# Patient Record
Sex: FEMALE | Race: WHITE | NOT HISPANIC OR LATINO | Employment: FULL TIME | URBAN - METROPOLITAN AREA
[De-identification: names, ages, dates, MRNs, and addresses within clinical notes are randomized per-mention and may not be internally consistent; named-entity substitution may affect disease eponyms.]

---

## 2024-06-18 ENCOUNTER — OFFICE VISIT (OUTPATIENT)
Dept: SURGERY | Facility: CLINIC | Age: 29
End: 2024-06-18
Payer: COMMERCIAL

## 2024-06-18 VITALS — HEIGHT: 67 IN | BODY MASS INDEX: 24.33 KG/M2 | WEIGHT: 155 LBS

## 2024-06-18 DIAGNOSIS — N64.4 MASTODYNIA OF LEFT BREAST: ICD-10-CM

## 2024-06-18 DIAGNOSIS — N63.22 BREAST LUMP ON LEFT SIDE AT 10 O'CLOCK POSITION: Primary | ICD-10-CM

## 2024-06-18 PROCEDURE — 1159F MED LIST DOCD IN RCRD: CPT | Mod: CPTII,S$GLB,, | Performed by: NURSE PRACTITIONER

## 2024-06-18 PROCEDURE — 3044F HG A1C LEVEL LT 7.0%: CPT | Mod: CPTII,S$GLB,, | Performed by: NURSE PRACTITIONER

## 2024-06-18 PROCEDURE — 3008F BODY MASS INDEX DOCD: CPT | Mod: CPTII,S$GLB,, | Performed by: NURSE PRACTITIONER

## 2024-06-18 PROCEDURE — 99999 PR PBB SHADOW E&M-EST. PATIENT-LVL III: CPT | Mod: PBBFAC,,, | Performed by: NURSE PRACTITIONER

## 2024-06-18 PROCEDURE — 1160F RVW MEDS BY RX/DR IN RCRD: CPT | Mod: CPTII,S$GLB,, | Performed by: NURSE PRACTITIONER

## 2024-06-18 PROCEDURE — 99204 OFFICE O/P NEW MOD 45 MIN: CPT | Mod: S$GLB,,, | Performed by: NURSE PRACTITIONER

## 2024-06-18 NOTE — ASSESSMENT & PLAN NOTE
We discussed our fibrocystic mastopathy protocol in detail. She knows that if she follows this protocol - that her symptoms should improve.  We discussed how breast pain is usually not associated with breast cancer, however, pain can be the presenting symptom with some cancers (but this could be coincidental). Still, if her pain does not improve in 8-12 weeks she should call us back for additional recommendations.

## 2024-06-18 NOTE — PROGRESS NOTES
[facility]  Mark Cisneros MD, FACS  Korina Albrecht NP-C      Date of Service: 6/18/2024    Chief Complaint:   Jessa Rosenbaum is a 28 y.o. female presenting today after her imaging workup found a suspicious mass in the left breast for which biopsy has been recommended.   She first noticed left breast pain two weeks ago.      History of Present Illness:   Jessa Rosenbaum is a 28 y.o. female who presents on June 18,2024 with a left breast mass for which biopsy has been recommended.  MD::: Elise Dietrich MD    Past Medical History:   Diagnosis Date    Breast lump on left side at 10 o'clock position 06/18/2024    Breast pain, left     Panic attacks       Past Surgical History:   Procedure Laterality Date    tonsils and adenoids      wisdom teeth removal          Current Outpatient Medications:     ALPRAZolam (XANAX) 0.25 MG tablet, alprazolam 0.25 mg tablet, Disp: , Rfl:     EPINEPHrine (EPIPEN) 0.3 mg/0.3 mL AtIn, , Disp: , Rfl:     FLUoxetine 10 MG capsule, Take 1 capsule by mouth once daily., Disp: , Rfl:     pantoprazole (PROTONIX) 40 MG tablet, , Disp: , Rfl:    Review of patient's allergies indicates:   Allergen Reactions    Tree nut Hives, Itching and Swelling    Latex, natural rubber     Nut - unspecified     Peanut Hives, Itching and Rash    Shellfish containing products Hives, Itching and Rash      Social History     Tobacco Use    Smoking status: Never    Smokeless tobacco: Never   Substance Use Topics    Alcohol use: Yes      Family History   Problem Relation Name Age of Onset    Breast cancer Other Mat great aunt 45 - 49    Ovarian cancer Neg Hx          Review of Systems   Integumentary:  Positive for breast tenderness. Negative for color change, rash, mole/lesion, breast mass and breast discharge.   Breast: Positive for tenderness.Negative for mass       Physical Exam   Constitutional: She appears well-developed. She is cooperative.   HENT:   Head:  Normocephalic.   Cardiovascular:  Normal rate and regular rhythm.            Pulmonary/Chest: She exhibits no tenderness and no bony tenderness. Right breast exhibits no mass, no nipple discharge, no skin change and no tenderness. Left breast exhibits no mass, no nipple discharge, no skin change and no tenderness.       Abdominal: Soft. Normal appearance.   Musculoskeletal: Lymphadenopathy:      Upper Body:      Right upper body: No supraclavicular or axillary adenopathy.      Left upper body: No supraclavicular or axillary adenopathy.     Neurological: She is alert.   Skin: No rash noted.          MAMMOGRAM REPORT: 6/17/2024:  This procedure was performed using tomosynthesis. Computer-aided detection was utilized in the interpretation of this examination.  There is no evidence of suspicious masses, calcifications, or other abnormal findings on the right side.  Focal asymmetry in the subareolar region of the left breast corresponding to the ultrasound finding as previously described.  No suspicious microcalcifications.  No architectural distortion. Impression: Left Breast focal asymmetry. Please see dedicated ultrasound report for findings and recommendations     ULTRASOUND REPORT: Left 6/17/2024- At the site of pain in the left breast at the 10 o'clock position, there is a subcutaneous complicated cystic mass with a solid-appearing component that has indistinct margins.  Overall the cystic mass measures 0.9 x 0.5 cm.  No internal blood flow or shadowing. Impression:  Complicated cystic mass within indistinct solid component at the 10 o'clock position of the left breast.  Recommend ultrasound-guided biopsy.  The findings and recommendations were discussed with the patient in the physician's office representative.       NOTE:::We viewed her films together at today's visit.  We discussed the multiple views obtained and the important findings.  Even benign changes were mentioned and her questions were answered.     ASSESSMENT and PLAN OF CARE     1. Breast lump on left side at 10 o'clock position  Assessment & Plan:  Patient has been given the options of sonocore and excisional biopsy and all indications for each have been discussed. We reviewed her films and reports. She understands the reasons behind obtaining tissue in order to determine a diagnosis. She knows that further recommendations will be made after receiving the pathology report and that additional surgery/interventions may be needed. PLAN:::: LEFT SONOCORE BREAST BIOPSY. I will call her as soon as her pathology report is received        2. Mastodynia of left breast  Assessment & Plan:  We discussed our fibrocystic mastopathy protocol in detail. She knows that if she follows this protocol - that her symptoms should improve.  We discussed how breast pain is usually not associated with breast cancer, however, pain can be the presenting symptom with some cancers (but this could be coincidental). Still, if her pain does not improve in 8-12 weeks she should call us back for additional recommendations.          Medical Decision Making: It is my impression that this patient suffers all conditions contained in this medical document.  Each of these conditions did affect our plan of care and my medical decision making today.  It is my opinion that the medical decision making concerning this patient was of moderate to high difficulty based on the aforementioned conditions.  Any further recommendations will be communicated to the patient by me.  I have reviewed and verified her allergies, list of medications, medical and surgical histories, social history, and a pertinent review of symptoms.    Follow up:  I will phone her with her pathology report and make additional recommendations at that time.        6/24/2024 9:57 AM ADDENDUM: Her left sonocore breast biopsy showed benign changes, and nothing atypical or cancerous. We will repeat her mammogram and US  in 6 months - if  this correlates.  She is aware.    Mark Cisneros MD, FACS

## 2024-06-18 NOTE — ASSESSMENT & PLAN NOTE
Patient has been given the options of sonocore and excisional biopsy and all indications for each have been discussed. We reviewed her films and reports. She understands the reasons behind obtaining tissue in order to determine a diagnosis. She knows that further recommendations will be made after receiving the pathology report and that additional surgery/interventions may be needed. PLAN:::: LEFT SONOCORE BREAST BIOPSY. I will call her as soon as her pathology report is received

## 2024-06-28 ENCOUNTER — PATIENT MESSAGE (OUTPATIENT)
Dept: SURGERY | Facility: CLINIC | Age: 29
End: 2024-06-28
Payer: COMMERCIAL